# Patient Record
Sex: FEMALE | Race: OTHER | NOT HISPANIC OR LATINO | URBAN - METROPOLITAN AREA
[De-identification: names, ages, dates, MRNs, and addresses within clinical notes are randomized per-mention and may not be internally consistent; named-entity substitution may affect disease eponyms.]

---

## 2017-08-28 VITALS
HEART RATE: 86 BPM | RESPIRATION RATE: 18 BRPM | OXYGEN SATURATION: 96 % | WEIGHT: 257.5 LBS | DIASTOLIC BLOOD PRESSURE: 56 MMHG | HEIGHT: 64 IN | SYSTOLIC BLOOD PRESSURE: 111 MMHG

## 2017-08-28 RX ORDER — CHLORHEXIDINE GLUCONATE 213 G/1000ML
1 SOLUTION TOPICAL ONCE
Qty: 0 | Refills: 0 | Status: DISCONTINUED | OUTPATIENT
Start: 2017-08-30 | End: 2017-08-30

## 2017-08-28 NOTE — H&P ADULT - HISTORY OF PRESENT ILLNESS
***SKELETON:    57 y.o Female with PMHx of HTN, hyperlipidemia, NIDDM, who presented to her cardiologist c/o       Denies CP,  SOB, palpitations, dizziness, syncope, recent PND/orthopnea, LE edema, or decrease in exercise tolerance.  Pt subsquently underwent a Stress Echo on   which revealed       In light of pt's risk factors, above CCS Anginal Class _____ symptoms, and an abnormal Stress test, pt is now referred to [facility] for recommended Cardiac Cath with possible intervention if clinically indicated to  r/o suspected underlying CAD. ***SKELETON:    57 y.o Female with PMHx of HTN, hyperlipidemia, NIDDM, GERD, Depression, Vertigo, who presented to her cardiologist c/o       Denies CP,  SOB, palpitations, dizziness, syncope, recent PND/orthopnea, LE edema, or decrease in exercise tolerance.  Pt subsequently underwent a Stress Echo on 07/29/17 which revealed anterior and anteroseptal ischemia, LVEF of 66%.         In light of pt's risk factors, above CCS Anginal Class _____ symptoms, and an abnormal Stress test, pt is now referred to [facility] for recommended Cardiac Cath with possible intervention if clinically indicated to  r/o suspected underlying CAD. ***SKELETON:    57 y.o Female with PMHx of HTN, hyperlipidemia, NIDDM, GERD, Depression, Vertigo, who presented to her cardiologist c/o MICHAEL with increasing fatigue when walking ......      Denies CP,  SOB, palpitations, dizziness, syncope, recent PND/orthopnea, LE edema, or decrease in exercise tolerance.  Pt subsequently underwent a Stress Echo on 07/29/17 which revealed anterior and anteroseptal ischemia, LVEF of 66%.   Of note, moderate shortness of breath was noted with exercise.      In light of pt's risk factors, above CCS Anginal Class 3 Anginal Equivalent symptoms, and an abnormal Stress Echo, pt is now referred to [facility] for recommended Cardiac Cath with possible intervention if clinically indicated to  r/o suspected underlying CAD. ****HISTORY OBTAINED FROM PATIENT FROM DAUGHTER JIN  SEMI RELIABLE HISTORIAN       ***PT TO BRING IN ALL MEDS     57 y.o Uzbek Speaking Female with PMHx of HTN, hyperlipidemia, IDDM, GERD, Depression, Vertigo, who presented to her cardiologist Dr. Queen for routine follow-up and  reported  MICHAEL with increasing fatigue when walking a distance of >4 city blocks for the past 4 months.  Pt states she often has to stop and rest for awhile to catch her breath before she can continue walking.  She denies experiencing any CP,  recent PND/orthopnea, LE edema, palpitations, dizziness, or  syncope.  Pt subsequently underwent a Stress Echo on 07/29/17 which revealed anterior and anteroseptal ischemia with an  LVEF of 66%, moderate shortness of breath was noted with exercise.  Of Note: As per MD note Pt was noted to have an enlarged thyroid on PE likely multinodular goiter was noted and pt is scheduled for a thyroid US on 09/09/17.      In light of pt's risk factors, above CCS Anginal Class 3 Anginal Equivalent symptoms, and an abnormal Stress Echo, pt is now referred to [facility] for recommended Cardiac Cath with possible intervention if clinically indicated to  r/o suspected underlying CAD. ****HISTORY OBTAINED FROM PATIENT FROM DAUGHTER JIN  SEMI RELIABLE HISTORIAN       ***PT TO BRING IN ALL MEDS     57 y.o Macedonian Speaking Female with PMHx of HTN, hyperlipidemia, IDDM, GERD, Depression, Vertigo, who presented to her cardiologist Dr. Queen for routine follow-up and  reported  MICAHEL with increasing fatigue when walking a distance of >4 city blocks for the past 4 months.  Pt states she often has to stop and rest for awhile to catch her breath before she can continue walking.  She denies experiencing any CP,  recent PND/orthopnea, LE edema, palpitations, dizziness, or  syncope.  Pt subsequently underwent a Stress Echo on 07/29/17 which revealed anterior and anteroseptal ischemia with an  LVEF of 66%, moderate shortness of breath was noted with exercise.  Of Note: As per MD note Pt was noted to have an enlarged thyroid on PE likely multinodular goiter.  Pt is scheduled for a thyroid US on 09/09/17.      In light of pt's risk factors, above CCS Anginal Class 3 Anginal Equivalent symptoms, and an abnormal Stress Echo, pt is now referred to [facility] for recommended Cardiac Cath with possible intervention if clinically indicated to  r/o suspected underlying CAD. ****HISTORY OBTAINED FROM PATIENT FROM DAUGHTER JIN  SEMI RELIABLE HISTORIAN       57 y.o Wol[place] Speaking Female with PMHx of HTN, hyperlipidemia, IDDM, GERD, Depression, Vertigo, who presented to her cardiologist Dr. Queen for routine follow-up and  reported  MICHAEL with increasing fatigue when walking a distance of >4 city blocks for the past 4 months.  Pt states she often has to stop and rest for awhile to catch her breath before she can continue walking.  She denies experiencing any CP,  recent PND/orthopnea, LE edema, palpitations, dizziness, or  syncope.  Pt subsequently underwent a Stress Echo on 07/29/17 which revealed anterior and anteroseptal ischemia with an  LVEF of 66%, moderate shortness of breath was noted with exercise.  Of Note: As per MD note Pt was noted to have an enlarged thyroid on PE likely multinodular goiter.  Pt is scheduled for a thyroid US on 09/09/17.      In light of pt's risk factors, above CCS Anginal Class 3 Anginal Equivalent symptoms, and an abnormal Stress Echo, pt is now referred to [facility] for recommended Cardiac Cath with possible intervention if clinically indicated to  r/o suspected underlying CAD.

## 2017-08-28 NOTE — H&P ADULT - PMH
Diabetes mellitus    Hyperlipidemia    Hypertension Depression    Diabetes mellitus    GERD (gastroesophageal reflux disease)    Hyperlipidemia    Hypertension

## 2017-08-28 NOTE — H&P ADULT - ASSESSMENT
57 y.o Sami Speaking Female with PMHx of HTN, hyperlipidemia, IDDM, GERD, Depression, Vertigo, who presented today to [facility] for recommended cardiac cath with possible intervention in light of pt's risk factors, CCS 3 anginal symptoms and abnormal ECHO.    ASA III and Mallampati III    OF NOTE: pt is loaded with  mg PO x1 and Plavix 600mg PO x1 prior to procedure. ISS ordered. The procedure was explained and the consent was taken with the help of Sami Pacific  # [id number] 57 y.o Yoruba Speaking Female with PMHx of HTN, hyperlipidemia, IDDM, GERD, Depression, Vertigo, who presented today to [facility] for recommended cardiac cath with possible intervention in light of pt's risk factors, CCS 3 anginal symptoms and abnormal ECHO.    ASA III and Mallampati III    OF NOTE: pt is loaded with  mg PO x1 and Plavix 600mg PO x1 prior to procedure. ISS ordered. The procedure was explained and the consent was taken with the help of Yoruba [place]  # [id number]    Risks & benefits of procedure and alternative therapy have been explained to the patient including but not limited to: allergic reaction, bleeding w/possible need for blood transfusion, infection, renal and vascular compromise, limb damage, arrhythmia, stroke, vessel dissection/perforation, Myocardial infarction, emergent CABG. Informed consent obtained and in chart. 57 y.o Kinyarwanda Speaking Female with PMHx of HTN, hyperlipidemia, IDDM, GERD, Depression, Vertigo, who presented today to [facility] for recommended cardiac cath with possible intervention in light of pt's risk factors, CCS 3 anginal symptoms and abnormal ECHO.    ASA III and Mallampati III    OF NOTE: pt is loaded with  mg PO x1 and Plavix 600mg PO x1 prior to procedure. ISS ordered. The procedure was explained and the consent was taken with the help of Kinyarwanda CanÃ³vanas  # [id number]. Pt's K 3.5, order KCL 40 mEq PO x1 ordered.    Risks & benefits of procedure and alternative therapy have been explained to the patient including but not limited to: allergic reaction, bleeding w/possible need for blood transfusion, infection, renal and vascular compromise, limb damage, arrhythmia, stroke, vessel dissection/perforation, Myocardial infarction, emergent CABG. Informed consent obtained and in chart.

## 2017-08-30 ENCOUNTER — OUTPATIENT (OUTPATIENT)
Dept: OUTPATIENT SERVICES | Facility: HOSPITAL | Age: 57
LOS: 1 days | Discharge: NURSING, REHAB OR LONG-TERM CARE | End: 2017-08-30
Payer: MEDICARE

## 2017-08-30 LAB
ALBUMIN SERPL ELPH-MCNC: 4.4 G/DL — SIGNIFICANT CHANGE UP (ref 3.3–5)
ALP SERPL-CCNC: 80 U/L — SIGNIFICANT CHANGE UP (ref 40–120)
ALT FLD-CCNC: 14 U/L — SIGNIFICANT CHANGE UP (ref 10–45)
ANION GAP SERPL CALC-SCNC: 15 MMOL/L — SIGNIFICANT CHANGE UP (ref 5–17)
APTT BLD: 27.5 SEC — SIGNIFICANT CHANGE UP (ref 27.5–37.4)
AST SERPL-CCNC: 12 U/L — SIGNIFICANT CHANGE UP (ref 10–40)
BASOPHILS NFR BLD AUTO: 0.2 % — SIGNIFICANT CHANGE UP (ref 0–2)
BILIRUB SERPL-MCNC: 0.4 MG/DL — SIGNIFICANT CHANGE UP (ref 0.2–1.2)
BUN SERPL-MCNC: 15 MG/DL — SIGNIFICANT CHANGE UP (ref 7–23)
CALCIUM SERPL-MCNC: 9.7 MG/DL — SIGNIFICANT CHANGE UP (ref 8.4–10.5)
CHLORIDE SERPL-SCNC: 96 MMOL/L — SIGNIFICANT CHANGE UP (ref 96–108)
CHOLEST SERPL-MCNC: 143 MG/DL — SIGNIFICANT CHANGE UP (ref 10–199)
CK MB CFR SERPL CALC: 1.6 NG/ML — SIGNIFICANT CHANGE UP (ref 0–6.7)
CO2 SERPL-SCNC: 30 MMOL/L — SIGNIFICANT CHANGE UP (ref 22–31)
CREAT SERPL-MCNC: 0.6 MG/DL — SIGNIFICANT CHANGE UP (ref 0.5–1.3)
CRP SERPL-MCNC: 1.8 MG/DL — HIGH (ref 0–0.4)
EOSINOPHIL NFR BLD AUTO: 0.6 % — SIGNIFICANT CHANGE UP (ref 0–6)
GLUCOSE SERPL-MCNC: 251 MG/DL — HIGH (ref 70–99)
HBA1C BLD-MCNC: 9.4 % — HIGH (ref 4–5.6)
HCT VFR BLD CALC: 44.1 % — SIGNIFICANT CHANGE UP (ref 34.5–45)
HDLC SERPL-MCNC: 59 MG/DL — SIGNIFICANT CHANGE UP (ref 40–125)
HGB BLD-MCNC: 13.7 G/DL — SIGNIFICANT CHANGE UP (ref 11.5–15.5)
INR BLD: 0.93 — SIGNIFICANT CHANGE UP (ref 0.88–1.16)
LIPID PNL WITH DIRECT LDL SERPL: 57 MG/DL — SIGNIFICANT CHANGE UP
LYMPHOCYTES # BLD AUTO: 22.9 % — SIGNIFICANT CHANGE UP (ref 13–44)
MCHC RBC-ENTMCNC: 25.9 PG — LOW (ref 27–34)
MCHC RBC-ENTMCNC: 31.1 G/DL — LOW (ref 32–36)
MCV RBC AUTO: 83.5 FL — SIGNIFICANT CHANGE UP (ref 80–100)
MONOCYTES NFR BLD AUTO: 4.6 % — SIGNIFICANT CHANGE UP (ref 2–14)
NEUTROPHILS NFR BLD AUTO: 71.7 % — SIGNIFICANT CHANGE UP (ref 43–77)
PLATELET # BLD AUTO: 275 K/UL — SIGNIFICANT CHANGE UP (ref 150–400)
POTASSIUM SERPL-MCNC: 3.5 MMOL/L — SIGNIFICANT CHANGE UP (ref 3.5–5.3)
POTASSIUM SERPL-SCNC: 3.5 MMOL/L — SIGNIFICANT CHANGE UP (ref 3.5–5.3)
PROT SERPL-MCNC: 8.3 G/DL — SIGNIFICANT CHANGE UP (ref 6–8.3)
PROTHROM AB SERPL-ACNC: 10.3 SEC — SIGNIFICANT CHANGE UP (ref 9.8–12.7)
RBC # BLD: 5.28 M/UL — HIGH (ref 3.8–5.2)
RBC # FLD: 14.7 % — SIGNIFICANT CHANGE UP (ref 10.3–16.9)
SODIUM SERPL-SCNC: 141 MMOL/L — SIGNIFICANT CHANGE UP (ref 135–145)
TOTAL CHOLESTEROL/HDL RATIO MEASUREMENT: 2.4 RATIO — LOW (ref 3.3–7.1)
TRIGL SERPL-MCNC: 133 MG/DL — SIGNIFICANT CHANGE UP (ref 10–149)
WBC # BLD: 9.8 K/UL — SIGNIFICANT CHANGE UP (ref 3.8–10.5)
WBC # FLD AUTO: 9.8 K/UL — SIGNIFICANT CHANGE UP (ref 3.8–10.5)

## 2017-08-30 PROCEDURE — 82553 CREATINE MB FRACTION: CPT

## 2017-08-30 PROCEDURE — C1887: CPT

## 2017-08-30 PROCEDURE — C1769: CPT

## 2017-08-30 PROCEDURE — 85610 PROTHROMBIN TIME: CPT

## 2017-08-30 PROCEDURE — 93458 L HRT ARTERY/VENTRICLE ANGIO: CPT | Mod: 26

## 2017-08-30 PROCEDURE — 93010 ELECTROCARDIOGRAM REPORT: CPT

## 2017-08-30 PROCEDURE — 93005 ELECTROCARDIOGRAM TRACING: CPT

## 2017-08-30 PROCEDURE — 82550 ASSAY OF CK (CPK): CPT

## 2017-08-30 PROCEDURE — 85730 THROMBOPLASTIN TIME PARTIAL: CPT

## 2017-08-30 PROCEDURE — 86140 C-REACTIVE PROTEIN: CPT

## 2017-08-30 PROCEDURE — 80061 LIPID PANEL: CPT

## 2017-08-30 PROCEDURE — 83036 HEMOGLOBIN GLYCOSYLATED A1C: CPT

## 2017-08-30 PROCEDURE — 93458 L HRT ARTERY/VENTRICLE ANGIO: CPT

## 2017-08-30 PROCEDURE — 85025 COMPLETE CBC W/AUTO DIFF WBC: CPT

## 2017-08-30 PROCEDURE — 80053 COMPREHEN METABOLIC PANEL: CPT

## 2017-08-30 RX ORDER — POTASSIUM CHLORIDE 20 MEQ
40 PACKET (EA) ORAL ONCE
Qty: 0 | Refills: 0 | Status: DISCONTINUED | OUTPATIENT
Start: 2017-08-30 | End: 2017-08-30

## 2017-08-30 RX ORDER — SODIUM CHLORIDE 9 MG/ML
1000 INJECTION, SOLUTION INTRAVENOUS
Qty: 0 | Refills: 0 | Status: DISCONTINUED | OUTPATIENT
Start: 2017-08-30 | End: 2017-08-30

## 2017-08-30 RX ORDER — CALCIUM CARBONATE 500(1250)
1 TABLET ORAL
Qty: 0 | Refills: 0 | COMMUNITY

## 2017-08-30 RX ORDER — ASPIRIN/CALCIUM CARB/MAGNESIUM 324 MG
325 TABLET ORAL ONCE
Qty: 0 | Refills: 0 | Status: COMPLETED | OUTPATIENT
Start: 2017-08-30 | End: 2017-08-30

## 2017-08-30 RX ORDER — GLUCAGON INJECTION, SOLUTION 0.5 MG/.1ML
1 INJECTION, SOLUTION SUBCUTANEOUS ONCE
Qty: 0 | Refills: 0 | Status: DISCONTINUED | OUTPATIENT
Start: 2017-08-30 | End: 2017-08-30

## 2017-08-30 RX ORDER — SODIUM CHLORIDE 9 MG/ML
1000 INJECTION INTRAMUSCULAR; INTRAVENOUS; SUBCUTANEOUS
Qty: 0 | Refills: 0 | Status: DISCONTINUED | OUTPATIENT
Start: 2017-08-30 | End: 2017-08-30

## 2017-08-30 RX ORDER — METFORMIN HYDROCHLORIDE 850 MG/1
1 TABLET ORAL
Qty: 0 | Refills: 0 | COMMUNITY

## 2017-08-30 RX ORDER — DEXTROSE 50 % IN WATER 50 %
25 SYRINGE (ML) INTRAVENOUS ONCE
Qty: 0 | Refills: 0 | Status: DISCONTINUED | OUTPATIENT
Start: 2017-08-30 | End: 2017-08-30

## 2017-08-30 RX ORDER — ASPIRIN/CALCIUM CARB/MAGNESIUM 324 MG
325 TABLET ORAL ONCE
Qty: 0 | Refills: 0 | Status: DISCONTINUED | OUTPATIENT
Start: 2017-08-30 | End: 2017-08-30

## 2017-08-30 RX ORDER — INSULIN LISPRO 100/ML
VIAL (ML) SUBCUTANEOUS ONCE
Qty: 0 | Refills: 0 | Status: DISCONTINUED | OUTPATIENT
Start: 2017-08-30 | End: 2017-08-30

## 2017-08-30 RX ORDER — CLOPIDOGREL BISULFATE 75 MG/1
600 TABLET, FILM COATED ORAL ONCE
Qty: 0 | Refills: 0 | Status: COMPLETED | OUTPATIENT
Start: 2017-08-30 | End: 2017-08-30

## 2017-08-30 RX ORDER — CLOPIDOGREL BISULFATE 75 MG/1
600 TABLET, FILM COATED ORAL ONCE
Qty: 0 | Refills: 0 | Status: DISCONTINUED | OUTPATIENT
Start: 2017-08-30 | End: 2017-08-30

## 2017-08-30 RX ORDER — SODIUM CHLORIDE 9 MG/ML
500 INJECTION INTRAMUSCULAR; INTRAVENOUS; SUBCUTANEOUS
Qty: 0 | Refills: 0 | Status: DISCONTINUED | OUTPATIENT
Start: 2017-08-30 | End: 2017-08-30

## 2017-08-30 RX ORDER — OMEPRAZOLE 10 MG/1
1 CAPSULE, DELAYED RELEASE ORAL
Qty: 0 | Refills: 0 | COMMUNITY

## 2017-08-30 RX ORDER — DEXTROSE 50 % IN WATER 50 %
12.5 SYRINGE (ML) INTRAVENOUS ONCE
Qty: 0 | Refills: 0 | Status: DISCONTINUED | OUTPATIENT
Start: 2017-08-30 | End: 2017-08-30

## 2017-08-30 RX ORDER — MECLIZINE HCL 12.5 MG
1 TABLET ORAL
Qty: 0 | Refills: 0 | COMMUNITY

## 2017-08-30 RX ORDER — DEXTROSE 50 % IN WATER 50 %
1 SYRINGE (ML) INTRAVENOUS ONCE
Qty: 0 | Refills: 0 | Status: DISCONTINUED | OUTPATIENT
Start: 2017-08-30 | End: 2017-08-30

## 2017-08-30 RX ORDER — SIMVASTATIN 20 MG/1
1 TABLET, FILM COATED ORAL
Qty: 0 | Refills: 0 | COMMUNITY

## 2017-08-30 RX ORDER — ASPIRIN/CALCIUM CARB/MAGNESIUM 324 MG
1 TABLET ORAL
Qty: 0 | Refills: 0 | COMMUNITY

## 2017-08-30 RX ORDER — DULOXETINE HYDROCHLORIDE 30 MG/1
1 CAPSULE, DELAYED RELEASE ORAL
Qty: 0 | Refills: 0 | COMMUNITY

## 2017-08-30 RX ORDER — CANAGLIFLOZIN 100 MG/1
1 TABLET, FILM COATED ORAL
Qty: 0 | Refills: 0 | COMMUNITY

## 2017-08-30 RX ADMIN — SODIUM CHLORIDE 75 MILLILITER(S): 9 INJECTION INTRAMUSCULAR; INTRAVENOUS; SUBCUTANEOUS at 10:15

## 2017-08-30 RX ADMIN — Medication 325 MILLIGRAM(S): at 10:01

## 2017-08-30 RX ADMIN — CLOPIDOGREL BISULFATE 600 MILLIGRAM(S): 75 TABLET, FILM COATED ORAL at 10:01

## 2017-08-30 NOTE — PROCEDURE NOTE - SUPERVISORY STATEMENT
- Medical therapy for CV risk factors  - Weight loss  - Follow up with Dr. Queen in 2 weeks (primary cardiologist)

## 2017-08-30 NOTE — PROGRESS NOTE ADULT - SUBJECTIVE AND OBJECTIVE BOX
Interventional Cardiology PA SDA Discharge Note    No complaints.   Afebrile, VSS    Ext:      R Radial:   no hematoma, no bleeding, radial pulse 1+, radial band in place to be removed at 1:30PM    A/P: 58 y/o F w/ PMHX HTN, HLD, DM, GERD, h/o Vertigo w/ CCS Angina Class 2-3 Sx and abnormal stress Echo.     Pt s/p cardiac cath today: normal coronaries, EF normal. EF 6mmHg     Stable for discharge as per attending Dr. Govea after bed rest, pt voids, wrist stable, and 30 min. after ambulation.  Follow-up with PMD/Cardiologist Dr. Queen in 1 week  Discharged forms signed and copies in chart

## 2017-09-05 DIAGNOSIS — Z79.4 LONG TERM (CURRENT) USE OF INSULIN: ICD-10-CM

## 2017-09-05 DIAGNOSIS — I10 ESSENTIAL (PRIMARY) HYPERTENSION: ICD-10-CM

## 2017-09-05 DIAGNOSIS — I25.110 ATHEROSCLEROTIC HEART DISEASE OF NATIVE CORONARY ARTERY WITH UNSTABLE ANGINA PECTORIS: ICD-10-CM

## 2017-09-05 DIAGNOSIS — E11.9 TYPE 2 DIABETES MELLITUS WITHOUT COMPLICATIONS: ICD-10-CM

## 2017-09-05 DIAGNOSIS — E66.01 MORBID (SEVERE) OBESITY DUE TO EXCESS CALORIES: ICD-10-CM

## 2017-09-05 DIAGNOSIS — R94.39 ABNORMAL RESULT OF OTHER CARDIOVASCULAR FUNCTION STUDY: ICD-10-CM

## 2017-09-05 DIAGNOSIS — E78.5 HYPERLIPIDEMIA, UNSPECIFIED: ICD-10-CM
